# Patient Record
Sex: MALE | Race: WHITE | NOT HISPANIC OR LATINO | Employment: PART TIME | URBAN - METROPOLITAN AREA
[De-identification: names, ages, dates, MRNs, and addresses within clinical notes are randomized per-mention and may not be internally consistent; named-entity substitution may affect disease eponyms.]

---

## 2018-01-11 ENCOUNTER — GENERIC CONVERSION - ENCOUNTER (OUTPATIENT)
Dept: URGENT CARE | Facility: MEDICAL CENTER | Age: 23
End: 2018-01-11

## 2018-01-11 ENCOUNTER — OFFICE VISIT (OUTPATIENT)
Dept: URGENT CARE | Facility: MEDICAL CENTER | Age: 23
End: 2018-01-11
Payer: COMMERCIAL

## 2018-01-11 DIAGNOSIS — R68.89 OTHER GENERAL SYMPTOMS AND SIGNS: ICD-10-CM

## 2018-01-11 PROCEDURE — 99284 EMERGENCY DEPT VISIT MOD MDM: CPT

## 2018-01-11 PROCEDURE — G0383 LEV 4 HOSP TYPE B ED VISIT: HCPCS

## 2018-01-12 ENCOUNTER — APPOINTMENT (OUTPATIENT)
Dept: LAB | Facility: HOSPITAL | Age: 23
End: 2018-01-12
Payer: COMMERCIAL

## 2018-01-12 DIAGNOSIS — R68.89 OTHER GENERAL SYMPTOMS AND SIGNS: ICD-10-CM

## 2018-01-12 LAB
FLUAV AG SPEC QL: NORMAL
FLUBV AG SPEC QL: NORMAL
RSV B RNA SPEC QL NAA+PROBE: NORMAL

## 2018-01-12 PROCEDURE — 87798 DETECT AGENT NOS DNA AMP: CPT

## 2018-01-13 NOTE — PROGRESS NOTES
Assessment   1  Flu-like symptoms (780 62) (M62 72)    Discussion/Summary   Discussion Summary:    Your symptoms are concerning for flu-like illness  At this point I advised to use over-the-counter Aleve D or Advil cold and Sinus or Tylenol cold and Sinus as per label instructions  Plenty of rest and plenty of fluids advised  Keep a close eye on the symptoms and if symptoms are worsening especially worsening cough and fever, follow up with your PCP for immediate evaluation  Medication Side Effects Reviewed: Possible side effects of new medications were reviewed with the patient/guardian today  Understands and agrees with treatment plan: The treatment plan was reviewed with the patient/guardian  The patient/guardian understands and agrees with the treatment plan    Follow Up Instructions: Follow Up with your Primary Care Provider in 5 days  If your symptoms worsen, go to the nearest [facility]  Chief Complaint   1  Fever  Chief Complaint Free Text Note Form: Patient complains of flu like symptoms,shortness of breath,chest and sinus congestion and it hurts to take a deep breath  Patient states it started last night  History of Present Illness   Hospital Based Practices Required Assessment:      Pain Assessment      the patient states they have pain  (on a scale of 0 to 10, the patient rates the pain at 3 )      Abuse And Domestic Violence Screen       Yes, the patient is safe at home  -- The patient states no one is hurting them  Depression And Suicide Screen  No, the patient has not had thoughts of hurting themself  No, the patient has not felt depressed in the past 7 days  Prefered Language is  english  Primary Language is  english  Fever: TABITHA FUENTES presents with complaints of fever        Associated symptoms include sore throat,-- cough,-- rhinorrhea,-- nausea,-- nasal congestion,-- body aches-- and-- fatigue, but-- no ear pain,-- no skin redness,-- no skin swelling,-- no rash,-- no headache,-- no decreased appetite,-- no vomiting,-- no abdominal pain,-- no dysuria,-- no mouth sores,-- no neck stiffness,-- no facial pain,-- no eye redness,-- no wheezing,-- no dyspnea,-- no flank pain,-- no joint pain,-- no pain with weight bearing,-- no weakness-- and-- no weight loss  Review of Systems   Focused-Male:      Constitutional: as noted in HPI  Gastrointestinal: no complaints of abdominal pain, no constipation, no nausea or vomiting, no diarrhea or bloody stools  Current Meds    1  Claritin 10 MG Oral Capsule; Therapy: (Recorded:11Jan2018) to Recorded    Allergies   1  No Known Drug Allergies    Vitals   Signs   Recorded: [id number] 03:02PM   Temperature: 99 1 F, Tympanic  Heart Rate: 100  Respiration: 16  Systolic: 220, Sitting  Diastolic: 71, Sitting  Height: 6 ft 1 in  Weight: 176 lb   BMI Calculated: 23 22  BSA Calculated: 2 04  O2 Saturation: 98, RA  Pain Scale: 3    Physical Exam        Constitutional      General appearance: No acute distress, well appearing and well nourished  Ears, Nose, Mouth, and Throat      External inspection of ears and nose: Normal        Otoscopic examination: Tympanic membrance translucent with normal light reflex  Canals patent without erythema  Nasal mucosa, septum, and turbinates: Abnormal   There was a mucoid discharge from both nares  The bilateral nasal mucosa was edematous  Oropharynx: Normal with no erythema, edema, exudate or lesions  Pulmonary      Respiratory effort: No increased work of breathing or signs of respiratory distress  Auscultation of lungs: Clear to auscultation  Cardiovascular      Auscultation of heart: Normal rate and rhythm, normal S1 and S2, without murmurs  Lymphatic      Palpation of lymph nodes in neck: No lymphadenopathy  Message   Return to work or school:    Alex Dhaliwal is under my professional care   He was seen in my office on 01/11/2018    He is able to return to work on  01/13/2018             Signatures    Electronically signed by : NAIF Carbajal ; Jan 11 2018  5:09PM EST                       (Author)

## 2018-01-23 VITALS
TEMPERATURE: 99.1 F | HEIGHT: 73 IN | SYSTOLIC BLOOD PRESSURE: 120 MMHG | RESPIRATION RATE: 16 BRPM | OXYGEN SATURATION: 98 % | WEIGHT: 176 LBS | HEART RATE: 100 BPM | DIASTOLIC BLOOD PRESSURE: 71 MMHG | BODY MASS INDEX: 23.33 KG/M2

## 2018-02-28 NOTE — MISCELLANEOUS
Message  Return to work or school:   Tricia Miner is under my professional care   He was seen in my office on 01/11/2018   He is able to return to work on  01/13/2018            Signatures   Electronically signed by : NAIF Ramírez ; Jan 11 2018  5:09PM EST                       (Author)

## 2018-03-30 RX ORDER — LORATADINE 10 MG/1
CAPSULE, LIQUID FILLED ORAL
COMMUNITY

## 2023-12-01 ENCOUNTER — OFFICE VISIT (OUTPATIENT)
Dept: FAMILY MEDICINE CLINIC | Facility: CLINIC | Age: 28
End: 2023-12-01
Payer: COMMERCIAL

## 2023-12-01 VITALS
WEIGHT: 172.2 LBS | DIASTOLIC BLOOD PRESSURE: 64 MMHG | BODY MASS INDEX: 22.1 KG/M2 | OXYGEN SATURATION: 98 % | RESPIRATION RATE: 18 BRPM | HEIGHT: 74 IN | TEMPERATURE: 98.3 F | SYSTOLIC BLOOD PRESSURE: 110 MMHG | HEART RATE: 92 BPM

## 2023-12-01 DIAGNOSIS — Z00.00 ANNUAL PHYSICAL EXAM: Primary | ICD-10-CM

## 2023-12-01 DIAGNOSIS — G43.109 MIGRAINE WITH AURA AND WITHOUT STATUS MIGRAINOSUS, NOT INTRACTABLE: ICD-10-CM

## 2023-12-01 PROCEDURE — 99385 PREV VISIT NEW AGE 18-39: CPT | Performed by: FAMILY MEDICINE

## 2023-12-01 RX ORDER — CETIRIZINE HYDROCHLORIDE 10 MG/1
10 TABLET ORAL DAILY
COMMUNITY

## 2023-12-01 NOTE — ASSESSMENT & PLAN NOTE
Currently doing well off medication, reports migraines about 4 times per year, not as severe as in the past.

## 2023-12-01 NOTE — PROGRESS NOTES
[facility] PRIMARY CARE    NAME: Ted Fontaine  AGE: 29 y.o. SEX: male  : 1995     DATE: 2023     Assessment and Plan:     Problem List Items Addressed This Visit          Cardiovascular and Mediastinum    Migraine with aura and without status migrainosus, not intractable     Currently doing well off medication, reports migraines about 4 times per year, not as severe as in the past.          Other Visit Diagnoses       Annual physical exam    -  Primary    Relevant Orders    Hemoglobin A1C    Lipid panel            Immunizations and preventive care screenings were discussed with patient today. Appropriate education was printed on patient's after visit summary. Counseling:  Alcohol/drug use: discussed moderation in alcohol intake, the recommendations for healthy alcohol use, and avoidance of illicit drug use. Dental Health: discussed importance of regular tooth brushing, flossing, and dental visits. Injury prevention: discussed safety/seat belts, safety helmets, smoke detectors, carbon dioxide detectors, and smoking near bedding or upholstery. Sexual health: discussed sexually transmitted diseases, partner selection, use of condoms, avoidance of unintended pregnancy, and contraceptive alternatives. Exercise: the importance of regular exercise/physical activity was discussed. Recommend exercise 3-5 times per week for at least 30 minutes. Depression Screening and Follow-up Plan: Patient was screened for depression during today's encounter. They screened negative with a PHQ-2 score of 2. Return in about 1 year (around 2024). Chief Complaint:     Chief Complaint   Patient presents with    Establish Care      History of Present Illness:     Adult Annual Physical   Patient here for a comprehensive physical exam. The patient reports migraines.     Permit form filled out and given to patient    Diet and Physical Activity  Diet/Nutrition: well balanced diet. Exercise: no formal exercise. Depression Screening  PHQ-2/9 Depression Screening    Little interest or pleasure in doing things: 1 - several days  Feeling down, depressed, or hopeless: 1 - several days  Trouble falling or staying asleep, or sleeping too much: 0 - not at all  Feeling tired or having little energy: 0 - not at all  Poor appetite or overeatin - not at all  Feeling bad about yourself - or that you are a failure or have let yourself or your family down: 0 - not at all  Trouble concentrating on things, such as reading the newspaper or watching television: 0 - not at all  Moving or speaking so slowly that other people could have noticed. Or the opposite - being so fidgety or restless that you have been moving around a lot more than usual: 0 - not at all  Thoughts that you would be better off dead, or of hurting yourself in some way: 0 - not at all  PHQ-2 Score: 2  PHQ-2 Interpretation: Negative depression screen  PHQ-9 Score: 2   PHQ-9 Interpretation: No or Minimal depression          General Health  Sleep: sleeps well. Hearing: normal - bilateral.  Vision: no vision problems. Review of Systems:     Review of Systems   Constitutional:  Negative for activity change and appetite change. Respiratory:  Negative for apnea and chest tightness. Cardiovascular:  Negative for chest pain and palpitations. Gastrointestinal:  Negative for abdominal distention and abdominal pain. Musculoskeletal:  Negative for arthralgias and back pain. Neurological:  Positive for headaches. Negative for dizziness, seizures, syncope and facial asymmetry.       Past Medical History:     Past Medical History:   Diagnosis Date    Allergic     Depression     Leti-Moore tear     Migraine, hemiplegic, intractable       Past Surgical History:     Past Surgical History:   Procedure Laterality Date    WISDOM TOOTH EXTRACTION Bilateral     tops only Social History:     Social History     Socioeconomic History    Marital status: Single     Spouse name: None    Number of children: None    Years of education: None    Highest education level: None   Occupational History    None   Tobacco Use    Smoking status: Never    Smokeless tobacco: Never   Vaping Use    Vaping Use: Every day    Substances: Nicotine   Substance and Sexual Activity    Alcohol use: Not Currently    Drug use: Yes     Types: Marijuana    Sexual activity: Yes     Partners: Female   Other Topics Concern    None   Social History Narrative    None     Social Determinants of Health     Financial Resource Strain: Not on file   Food Insecurity: Not on file   Transportation Needs: Not on file   Physical Activity: Not on file   Stress: Not on file   Social Connections: Not on file   Intimate Partner Violence: Not on file   Housing Stability: Not on file      Family History:     Family History   Problem Relation Age of Onset    No Known Problems Father     Heart disease Maternal Grandmother     Colon cancer Maternal Grandfather     Parkinsonism Maternal Grandfather     No Known Problems Paternal Grandmother     No Known Problems Paternal Grandfather       Current Medications:     Current Outpatient Medications   Medication Sig Dispense Refill    cetirizine (ZyrTEC) 10 mg tablet Take 10 mg by mouth daily       No current facility-administered medications for this visit. Allergies:     No Known Allergies   Physical Exam:     /64 (BP Location: Left arm, Patient Position: Sitting, Cuff Size: Standard)   Pulse 92   Temp 98.3 °F (36.8 °C) (Tympanic)   Resp 18   Ht 6' 2" (1.88 m)   Wt 78.1 kg (172 lb 3.2 oz)   SpO2 98%   BMI 22.11 kg/m²     Physical Exam  Constitutional:       Appearance: Normal appearance. Cardiovascular:      Rate and Rhythm: Normal rate and regular rhythm. Pulses: Normal pulses. Heart sounds: Normal heart sounds. Abdominal:      General: Abdomen is flat. Palpations: Abdomen is soft. Musculoskeletal:         General: Normal range of motion. Neurological:      General: No focal deficit present. Mental Status: He is alert and oriented to person, place, and time.           Hammad Simental MD   [address] [address]

## 2024-12-02 ENCOUNTER — OFFICE VISIT (OUTPATIENT)
Dept: FAMILY MEDICINE CLINIC | Facility: CLINIC | Age: 29
End: 2024-12-02
Payer: COMMERCIAL

## 2024-12-02 VITALS
BODY MASS INDEX: 21.94 KG/M2 | RESPIRATION RATE: 18 BRPM | WEIGHT: 162 LBS | HEART RATE: 98 BPM | DIASTOLIC BLOOD PRESSURE: 66 MMHG | OXYGEN SATURATION: 100 % | TEMPERATURE: 97.8 F | HEIGHT: 72 IN | SYSTOLIC BLOOD PRESSURE: 112 MMHG

## 2024-12-02 DIAGNOSIS — Z00.00 ANNUAL PHYSICAL EXAM: Primary | ICD-10-CM

## 2024-12-02 DIAGNOSIS — B36.9 FUNGAL RASH OF TRUNK: ICD-10-CM

## 2024-12-02 DIAGNOSIS — G43.109 MIGRAINE WITH AURA AND WITHOUT STATUS MIGRAINOSUS, NOT INTRACTABLE: ICD-10-CM

## 2024-12-02 PROCEDURE — 99395 PREV VISIT EST AGE 18-39: CPT | Performed by: FAMILY MEDICINE

## 2024-12-02 RX ORDER — FAMOTIDINE 20 MG/1
20 TABLET, FILM COATED ORAL
COMMUNITY

## 2024-12-02 RX ORDER — CLOTRIMAZOLE 1 %
CREAM (GRAM) TOPICAL 2 TIMES DAILY
Qty: 60 G | Refills: 0 | Status: SHIPPED | OUTPATIENT
Start: 2024-12-02

## 2024-12-02 NOTE — PATIENT INSTRUCTIONS
Schedule Tdap or TD vaccine      1. Annual physical exam  2. Migraine with aura and without status migrainosus, not intractable  Assessment & Plan:  Had episode in August none since, went to ER was admitted, no cause found  Orders:  -     CBC and differential; Future  -     Comprehensive metabolic panel; Future  -     Hemoglobin A1C; Future  -     Lipid panel; Future  3. Fungal rash of trunk

## 2024-12-02 NOTE — ASSESSMENT & PLAN NOTE
Airway  Urgency: elective    Date/Time: 12/13/2022 1:08 PM    General Information and Staff    Patient location during procedure: OR  CRNA/CAA: Fiordaliza Cristina CRNA    Indications and Patient Condition    Preoxygenated: yes  MILS maintained throughout  Mask difficulty assessment: 1 - vent by mask    Final Airway Details  Final airway type: supraglottic airway      Successful airway: unique  Size 4     Number of attempts at approach: 1  Assessment: lips, teeth, and gum same as pre-op and atraumatic intubation           Had episode in August none since, went to ER was admitted, no cause found, use marijuana which does relieve them typically, has seen neurology in the past.    Orders:    CBC and differential; Future    Comprehensive metabolic panel; Future    Hemoglobin A1C; Future    Lipid panel; Future

## 2024-12-02 NOTE — PROGRESS NOTES
Adult Annual Physical  Name: Ted Fontaine      : 1995      MRN: [id number]  Encounter Provider: Beni Valencia MD  Encounter Date: 2024   [facility]    Assessment & Plan  Annual physical exam         Migraine with aura and without status migrainosus, not intractable  Had episode in August none since, went to ER was admitted, no cause found, use marijuana which does relieve them typically, has seen neurology in the past.    Orders:    CBC and differential; Future    Comprehensive metabolic panel; Future    Hemoglobin A1C; Future    Lipid panel; Future    Fungal rash of trunk  Rash on abdomen for one year, appears to be tinea versicolor call if not resolving.    Orders:    clotrimazole (LOTRIMIN) 1 % cream; Apply topically 2 (two) times a day    Immunizations and preventive care screenings were discussed with patient today. Appropriate education was printed on patient's after visit summary.    Counseling:  Alcohol/drug use: discussed moderation in alcohol intake, the recommendations for healthy alcohol use, and avoidance of illicit drug use.  Dental Health: discussed importance of regular tooth brushing, flossing, and dental visits.  Injury prevention: discussed safety/seat belts, safety helmets, smoke detectors, carbon monoxide detectors, and smoking near bedding or upholstery.  Sexual health: discussed sexually transmitted diseases, partner selection, use of condoms, avoidance of unintended pregnancy, and contraceptive alternatives.  Exercise: the importance of regular exercise/physical activity was discussed. Recommend exercise 3-5 times per week for at least 30 minutes.       Depression Screening and Follow-up Plan: Patient was screened for depression during today's encounter. They screened negative with a PHQ-2 score of 1.    Tobacco Cessation Counseling: Tobacco cessation counseling was provided. The patient is sincerely urged to quit  consumption of tobacco. He is ready to quit tobacco. Medication options and side effects of medication discussed.       History of Present Illness     Adult Annual Physical:  Patient presents for annual physical.     Diet and Physical Activity:  - Diet/Nutrition: well balanced diet.  - Exercise: moderate cardiovascular exercise.    Depression Screening:  - PHQ-2 Score: 1    General Health:    - Hearing: normal hearing right ear.  - Vision: no vision problems.    Review of Systems   Constitutional:  Negative for activity change and appetite change.   Respiratory:  Negative for apnea and chest tightness.    Cardiovascular:  Negative for chest pain and palpitations.   Gastrointestinal:  Negative for abdominal distention and abdominal pain.   Musculoskeletal:  Negative for arthralgias and back pain.   Neurological:  Negative for dizziness and facial asymmetry.         Objective   /66 (BP Location: Left arm, Patient Position: Sitting, Cuff Size: Standard)   Pulse 98   Temp 97.8 °F (36.6 °C) (Tympanic)   Resp 18   Ht 6' (1.829 m)   Wt 73.5 kg (162 lb)   SpO2 100%   BMI 21.97 kg/m²     Physical Exam  Constitutional:       Appearance: Normal appearance.   Cardiovascular:      Rate and Rhythm: Normal rate and regular rhythm.      Pulses: Normal pulses.      Heart sounds: Normal heart sounds.   Pulmonary:      Effort: Pulmonary effort is normal.      Breath sounds: Normal breath sounds.   Musculoskeletal:         General: Normal range of motion.   Neurological:      General: No focal deficit present.      Mental Status: He is alert and oriented to person, place, and time.